# Patient Record
Sex: FEMALE | Race: WHITE | NOT HISPANIC OR LATINO | Employment: OTHER | URBAN - METROPOLITAN AREA
[De-identification: names, ages, dates, MRNs, and addresses within clinical notes are randomized per-mention and may not be internally consistent; named-entity substitution may affect disease eponyms.]

---

## 2023-02-07 ENCOUNTER — APPOINTMENT (OUTPATIENT)
Dept: GENERAL RADIOLOGY | Facility: CLINIC | Age: 73
End: 2023-02-07
Attending: EMERGENCY MEDICINE
Payer: COMMERCIAL

## 2023-02-07 ENCOUNTER — HOSPITAL ENCOUNTER (OUTPATIENT)
Facility: CLINIC | Age: 73
Setting detail: OBSERVATION
Discharge: HOME | End: 2023-02-09
Attending: EMERGENCY MEDICINE | Admitting: INTERNAL MEDICINE
Payer: COMMERCIAL

## 2023-02-07 ENCOUNTER — APPOINTMENT (OUTPATIENT)
Dept: ULTRASOUND IMAGING | Facility: CLINIC | Age: 73
End: 2023-02-07
Attending: EMERGENCY MEDICINE
Payer: COMMERCIAL

## 2023-02-07 DIAGNOSIS — R50.9 FEVER, UNSPECIFIED FEVER CAUSE: ICD-10-CM

## 2023-02-07 DIAGNOSIS — R74.8 ELEVATED LIVER ENZYMES: Primary | ICD-10-CM

## 2023-02-07 LAB
ALBUMIN SERPL BCG-MCNC: 3.9 G/DL (ref 3.5–5.2)
ALBUMIN UR-MCNC: 20 MG/DL
ALP SERPL-CCNC: 162 U/L (ref 35–104)
ALT SERPL W P-5'-P-CCNC: 299 U/L (ref 10–35)
ANION GAP SERPL CALCULATED.3IONS-SCNC: 10 MMOL/L (ref 7–15)
APPEARANCE UR: CLEAR
AST SERPL W P-5'-P-CCNC: 286 U/L (ref 10–35)
BACTERIA #/AREA URNS HPF: ABNORMAL /HPF
BASOPHILS # BLD AUTO: 0 10E3/UL (ref 0–0.2)
BASOPHILS NFR BLD AUTO: 0 %
BILIRUB SERPL-MCNC: 4.6 MG/DL
BILIRUB UR QL STRIP: NEGATIVE
BUN SERPL-MCNC: 10.8 MG/DL (ref 8–23)
CALCIUM SERPL-MCNC: 9 MG/DL (ref 8.8–10.2)
CHLORIDE SERPL-SCNC: 93 MMOL/L (ref 98–107)
COLOR UR AUTO: YELLOW
CREAT SERPL-MCNC: 0.84 MG/DL (ref 0.51–0.95)
DEPRECATED HCO3 PLAS-SCNC: 24 MMOL/L (ref 22–29)
EOSINOPHIL # BLD AUTO: 0 10E3/UL (ref 0–0.7)
EOSINOPHIL NFR BLD AUTO: 0 %
ERYTHROCYTE [DISTWIDTH] IN BLOOD BY AUTOMATED COUNT: 13.2 % (ref 10–15)
FLUAV RNA SPEC QL NAA+PROBE: NEGATIVE
FLUBV RNA RESP QL NAA+PROBE: NEGATIVE
GFR SERPL CREATININE-BSD FRML MDRD: 74 ML/MIN/1.73M2
GLUCOSE SERPL-MCNC: 159 MG/DL (ref 70–99)
GLUCOSE UR STRIP-MCNC: NEGATIVE MG/DL
HCT VFR BLD AUTO: 34.4 % (ref 35–47)
HGB BLD-MCNC: 11.8 G/DL (ref 11.7–15.7)
HGB UR QL STRIP: ABNORMAL
HOLD SPECIMEN: NORMAL
HOLD SPECIMEN: NORMAL
IMM GRANULOCYTES # BLD: 0.1 10E3/UL
IMM GRANULOCYTES NFR BLD: 0 %
KETONES UR STRIP-MCNC: ABNORMAL MG/DL
LACTATE SERPL-SCNC: 1 MMOL/L (ref 0.7–2)
LEUKOCYTE ESTERASE UR QL STRIP: NEGATIVE
LIPASE SERPL-CCNC: 25 U/L (ref 13–60)
LYMPHOCYTES # BLD AUTO: 0.9 10E3/UL (ref 0.8–5.3)
LYMPHOCYTES NFR BLD AUTO: 6 %
MCH RBC QN AUTO: 30.3 PG (ref 26.5–33)
MCHC RBC AUTO-ENTMCNC: 34.3 G/DL (ref 31.5–36.5)
MCV RBC AUTO: 88 FL (ref 78–100)
MONOCYTES # BLD AUTO: 1.2 10E3/UL (ref 0–1.3)
MONOCYTES NFR BLD AUTO: 8 %
NEUTROPHILS # BLD AUTO: 12.7 10E3/UL (ref 1.6–8.3)
NEUTROPHILS NFR BLD AUTO: 86 %
NITRATE UR QL: NEGATIVE
NRBC # BLD AUTO: 0 10E3/UL
NRBC BLD AUTO-RTO: 0 /100
NT-PROBNP SERPL-MCNC: 1771 PG/ML (ref 0–900)
PH UR STRIP: 6.5 [PH] (ref 5–7)
PLATELET # BLD AUTO: 210 10E3/UL (ref 150–450)
POTASSIUM SERPL-SCNC: 3.2 MMOL/L (ref 3.4–5.3)
PROT SERPL-MCNC: 7.2 G/DL (ref 6.4–8.3)
RBC # BLD AUTO: 3.9 10E6/UL (ref 3.8–5.2)
RBC URINE: 3 /HPF
RSV RNA SPEC NAA+PROBE: NEGATIVE
SARS-COV-2 RNA RESP QL NAA+PROBE: NEGATIVE
SODIUM SERPL-SCNC: 127 MMOL/L (ref 136–145)
SP GR UR STRIP: 1.01 (ref 1–1.03)
TROPONIN T SERPL HS-MCNC: 17 NG/L
UROBILINOGEN UR STRIP-MCNC: NORMAL MG/DL
WBC # BLD AUTO: 14.9 10E3/UL (ref 4–11)
WBC URINE: 2 /HPF

## 2023-02-07 PROCEDURE — 87040 BLOOD CULTURE FOR BACTERIA: CPT | Performed by: EMERGENCY MEDICINE

## 2023-02-07 PROCEDURE — 81001 URINALYSIS AUTO W/SCOPE: CPT | Performed by: EMERGENCY MEDICINE

## 2023-02-07 PROCEDURE — 85014 HEMATOCRIT: CPT | Performed by: EMERGENCY MEDICINE

## 2023-02-07 PROCEDURE — 250N000011 HC RX IP 250 OP 636: Performed by: EMERGENCY MEDICINE

## 2023-02-07 PROCEDURE — 120N000001 HC R&B MED SURG/OB

## 2023-02-07 PROCEDURE — 87637 SARSCOV2&INF A&B&RSV AMP PRB: CPT | Performed by: EMERGENCY MEDICINE

## 2023-02-07 PROCEDURE — 258N000003 HC RX IP 258 OP 636: Performed by: EMERGENCY MEDICINE

## 2023-02-07 PROCEDURE — 36415 COLL VENOUS BLD VENIPUNCTURE: CPT | Performed by: EMERGENCY MEDICINE

## 2023-02-07 PROCEDURE — 84484 ASSAY OF TROPONIN QUANT: CPT | Performed by: EMERGENCY MEDICINE

## 2023-02-07 PROCEDURE — 83605 ASSAY OF LACTIC ACID: CPT | Performed by: EMERGENCY MEDICINE

## 2023-02-07 PROCEDURE — 83690 ASSAY OF LIPASE: CPT | Performed by: EMERGENCY MEDICINE

## 2023-02-07 PROCEDURE — 96375 TX/PRO/DX INJ NEW DRUG ADDON: CPT

## 2023-02-07 PROCEDURE — 250N000013 HC RX MED GY IP 250 OP 250 PS 637: Performed by: EMERGENCY MEDICINE

## 2023-02-07 PROCEDURE — 76705 ECHO EXAM OF ABDOMEN: CPT

## 2023-02-07 PROCEDURE — 96361 HYDRATE IV INFUSION ADD-ON: CPT

## 2023-02-07 PROCEDURE — C9803 HOPD COVID-19 SPEC COLLECT: HCPCS

## 2023-02-07 PROCEDURE — 71046 X-RAY EXAM CHEST 2 VIEWS: CPT

## 2023-02-07 PROCEDURE — 83880 ASSAY OF NATRIURETIC PEPTIDE: CPT | Performed by: EMERGENCY MEDICINE

## 2023-02-07 PROCEDURE — 83735 ASSAY OF MAGNESIUM: CPT | Performed by: INTERNAL MEDICINE

## 2023-02-07 PROCEDURE — 96365 THER/PROPH/DIAG IV INF INIT: CPT

## 2023-02-07 PROCEDURE — 80053 COMPREHEN METABOLIC PANEL: CPT | Performed by: EMERGENCY MEDICINE

## 2023-02-07 PROCEDURE — 99222 1ST HOSP IP/OBS MODERATE 55: CPT | Mod: AI | Performed by: INTERNAL MEDICINE

## 2023-02-07 PROCEDURE — 99285 EMERGENCY DEPT VISIT HI MDM: CPT | Mod: 25,CS

## 2023-02-07 RX ORDER — POTASSIUM CHLORIDE 1.5 G/1.58G
20 POWDER, FOR SOLUTION ORAL ONCE
Status: COMPLETED | OUTPATIENT
Start: 2023-02-07 | End: 2023-02-08

## 2023-02-07 RX ORDER — LIDOCAINE 40 MG/G
CREAM TOPICAL
Status: DISCONTINUED | OUTPATIENT
Start: 2023-02-07 | End: 2023-02-09 | Stop reason: HOSPADM

## 2023-02-07 RX ORDER — ACETAMINOPHEN 650 MG/1
650 SUPPOSITORY RECTAL EVERY 6 HOURS PRN
Status: DISCONTINUED | OUTPATIENT
Start: 2023-02-07 | End: 2023-02-09 | Stop reason: HOSPADM

## 2023-02-07 RX ORDER — ACETAMINOPHEN 325 MG/1
650 TABLET ORAL EVERY 6 HOURS PRN
Status: DISCONTINUED | OUTPATIENT
Start: 2023-02-07 | End: 2023-02-09 | Stop reason: HOSPADM

## 2023-02-07 RX ORDER — ENOXAPARIN SODIUM 100 MG/ML
30 INJECTION SUBCUTANEOUS EVERY 24 HOURS
Status: DISCONTINUED | OUTPATIENT
Start: 2023-02-08 | End: 2023-02-09 | Stop reason: HOSPADM

## 2023-02-07 RX ORDER — PROCHLORPERAZINE MALEATE 5 MG
5 TABLET ORAL EVERY 6 HOURS PRN
Status: DISCONTINUED | OUTPATIENT
Start: 2023-02-07 | End: 2023-02-09 | Stop reason: HOSPADM

## 2023-02-07 RX ORDER — ONDANSETRON 4 MG/1
4 TABLET, ORALLY DISINTEGRATING ORAL EVERY 6 HOURS PRN
Status: DISCONTINUED | OUTPATIENT
Start: 2023-02-07 | End: 2023-02-09 | Stop reason: HOSPADM

## 2023-02-07 RX ORDER — ONDANSETRON 2 MG/ML
4 INJECTION INTRAMUSCULAR; INTRAVENOUS EVERY 6 HOURS PRN
Status: DISCONTINUED | OUTPATIENT
Start: 2023-02-07 | End: 2023-02-09 | Stop reason: HOSPADM

## 2023-02-07 RX ORDER — ONDANSETRON 2 MG/ML
4 INJECTION INTRAMUSCULAR; INTRAVENOUS ONCE
Status: COMPLETED | OUTPATIENT
Start: 2023-02-07 | End: 2023-02-07

## 2023-02-07 RX ORDER — PROCHLORPERAZINE 25 MG
12.5 SUPPOSITORY, RECTAL RECTAL EVERY 12 HOURS PRN
Status: DISCONTINUED | OUTPATIENT
Start: 2023-02-07 | End: 2023-02-09 | Stop reason: HOSPADM

## 2023-02-07 RX ORDER — FAMOTIDINE 20 MG/1
20 TABLET, FILM COATED ORAL 2 TIMES DAILY
Status: DISCONTINUED | OUTPATIENT
Start: 2023-02-07 | End: 2023-02-09 | Stop reason: HOSPADM

## 2023-02-07 RX ORDER — SODIUM CHLORIDE 9 MG/ML
INJECTION, SOLUTION INTRAVENOUS CONTINUOUS
Status: DISCONTINUED | OUTPATIENT
Start: 2023-02-07 | End: 2023-02-09 | Stop reason: HOSPADM

## 2023-02-07 RX ORDER — ACETAMINOPHEN 500 MG
1000 TABLET ORAL ONCE
Status: COMPLETED | OUTPATIENT
Start: 2023-02-07 | End: 2023-02-07

## 2023-02-07 RX ADMIN — ACETAMINOPHEN 1000 MG: 500 TABLET ORAL at 19:29

## 2023-02-07 RX ADMIN — ONDANSETRON 4 MG: 2 INJECTION INTRAMUSCULAR; INTRAVENOUS at 19:29

## 2023-02-07 RX ADMIN — SODIUM CHLORIDE 1000 ML: 9 INJECTION, SOLUTION INTRAVENOUS at 19:00

## 2023-02-07 RX ADMIN — TAZOBACTAM SODIUM AND PIPERACILLIN SODIUM 3.38 G: 375; 3 INJECTION, SOLUTION INTRAVENOUS at 21:17

## 2023-02-07 ASSESSMENT — ENCOUNTER SYMPTOMS
CHILLS: 1
SHORTNESS OF BREATH: 0
ABDOMINAL PAIN: 1
NAUSEA: 1
FEVER: 1
DIARRHEA: 0
VOMITING: 1
CONFUSION: 0
COUGH: 0
DYSURIA: 0
HEADACHES: 0

## 2023-02-07 ASSESSMENT — ACTIVITIES OF DAILY LIVING (ADL)
ADLS_ACUITY_SCORE: 35

## 2023-02-08 ENCOUNTER — APPOINTMENT (OUTPATIENT)
Dept: CT IMAGING | Facility: CLINIC | Age: 73
End: 2023-02-08
Attending: INTERNAL MEDICINE
Payer: COMMERCIAL

## 2023-02-08 LAB
ALBUMIN SERPL BCG-MCNC: 2.9 G/DL (ref 3.5–5.2)
ALP SERPL-CCNC: 122 U/L (ref 35–104)
ALT SERPL W P-5'-P-CCNC: 191 U/L (ref 10–35)
ANION GAP SERPL CALCULATED.3IONS-SCNC: 8 MMOL/L (ref 7–15)
AST SERPL W P-5'-P-CCNC: 147 U/L (ref 10–35)
BASOPHILS # BLD AUTO: 0 10E3/UL (ref 0–0.2)
BASOPHILS NFR BLD AUTO: 0 %
BILIRUB SERPL-MCNC: 2.9 MG/DL
BUN SERPL-MCNC: 8.9 MG/DL (ref 8–23)
CALCIUM SERPL-MCNC: 8.4 MG/DL (ref 8.8–10.2)
CHLORIDE SERPL-SCNC: 100 MMOL/L (ref 98–107)
CREAT SERPL-MCNC: 0.84 MG/DL (ref 0.51–0.95)
DEPRECATED HCO3 PLAS-SCNC: 23 MMOL/L (ref 22–29)
EOSINOPHIL # BLD AUTO: 0 10E3/UL (ref 0–0.7)
EOSINOPHIL NFR BLD AUTO: 0 %
ERYTHROCYTE [DISTWIDTH] IN BLOOD BY AUTOMATED COUNT: 13.4 % (ref 10–15)
GFR SERPL CREATININE-BSD FRML MDRD: 74 ML/MIN/1.73M2
GLUCOSE SERPL-MCNC: 118 MG/DL (ref 70–99)
HCT VFR BLD AUTO: 31.8 % (ref 35–47)
HGB BLD-MCNC: 10.8 G/DL (ref 11.7–15.7)
IMM GRANULOCYTES # BLD: 0.1 10E3/UL
IMM GRANULOCYTES NFR BLD: 1 %
LYMPHOCYTES # BLD AUTO: 1 10E3/UL (ref 0.8–5.3)
LYMPHOCYTES NFR BLD AUTO: 9 %
MAGNESIUM SERPL-MCNC: 1.6 MG/DL (ref 1.7–2.3)
MAGNESIUM SERPL-MCNC: 1.8 MG/DL (ref 1.7–2.3)
MCH RBC QN AUTO: 30.3 PG (ref 26.5–33)
MCHC RBC AUTO-ENTMCNC: 34 G/DL (ref 31.5–36.5)
MCV RBC AUTO: 89 FL (ref 78–100)
MONOCYTES # BLD AUTO: 0.7 10E3/UL (ref 0–1.3)
MONOCYTES NFR BLD AUTO: 6 %
NEUTROPHILS # BLD AUTO: 9.3 10E3/UL (ref 1.6–8.3)
NEUTROPHILS NFR BLD AUTO: 84 %
NRBC # BLD AUTO: 0 10E3/UL
NRBC BLD AUTO-RTO: 0 /100
PLATELET # BLD AUTO: 182 10E3/UL (ref 150–450)
POTASSIUM SERPL-SCNC: 3.4 MMOL/L (ref 3.4–5.3)
POTASSIUM SERPL-SCNC: 4.4 MMOL/L (ref 3.4–5.3)
PROT SERPL-MCNC: 6.2 G/DL (ref 6.4–8.3)
RBC # BLD AUTO: 3.57 10E6/UL (ref 3.8–5.2)
SODIUM SERPL-SCNC: 131 MMOL/L (ref 136–145)
WBC # BLD AUTO: 11.1 10E3/UL (ref 4–11)

## 2023-02-08 PROCEDURE — 96376 TX/PRO/DX INJ SAME DRUG ADON: CPT

## 2023-02-08 PROCEDURE — 86709 HEPATITIS A IGM ANTIBODY: CPT | Performed by: INTERNAL MEDICINE

## 2023-02-08 PROCEDURE — G0378 HOSPITAL OBSERVATION PER HR: HCPCS

## 2023-02-08 PROCEDURE — 36415 COLL VENOUS BLD VENIPUNCTURE: CPT | Performed by: INTERNAL MEDICINE

## 2023-02-08 PROCEDURE — 250N000011 HC RX IP 250 OP 636: Performed by: INTERNAL MEDICINE

## 2023-02-08 PROCEDURE — 250N000013 HC RX MED GY IP 250 OP 250 PS 637: Performed by: INTERNAL MEDICINE

## 2023-02-08 PROCEDURE — 96366 THER/PROPH/DIAG IV INF ADDON: CPT

## 2023-02-08 PROCEDURE — 99232 SBSQ HOSP IP/OBS MODERATE 35: CPT | Performed by: INTERNAL MEDICINE

## 2023-02-08 PROCEDURE — 258N000003 HC RX IP 258 OP 636: Performed by: INTERNAL MEDICINE

## 2023-02-08 PROCEDURE — 86790 VIRUS ANTIBODY NOS: CPT | Performed by: INTERNAL MEDICINE

## 2023-02-08 PROCEDURE — 87340 HEPATITIS B SURFACE AG IA: CPT | Performed by: INTERNAL MEDICINE

## 2023-02-08 PROCEDURE — 86706 HEP B SURFACE ANTIBODY: CPT | Performed by: INTERNAL MEDICINE

## 2023-02-08 PROCEDURE — 96372 THER/PROPH/DIAG INJ SC/IM: CPT | Performed by: INTERNAL MEDICINE

## 2023-02-08 PROCEDURE — 85025 COMPLETE CBC W/AUTO DIFF WBC: CPT | Performed by: INTERNAL MEDICINE

## 2023-02-08 PROCEDURE — 84132 ASSAY OF SERUM POTASSIUM: CPT | Performed by: INTERNAL MEDICINE

## 2023-02-08 PROCEDURE — 74177 CT ABD & PELVIS W/CONTRAST: CPT

## 2023-02-08 PROCEDURE — 250N000011 HC RX IP 250 OP 636: Performed by: EMERGENCY MEDICINE

## 2023-02-08 PROCEDURE — 86803 HEPATITIS C AB TEST: CPT | Performed by: INTERNAL MEDICINE

## 2023-02-08 PROCEDURE — 80053 COMPREHEN METABOLIC PANEL: CPT | Performed by: INTERNAL MEDICINE

## 2023-02-08 PROCEDURE — 83735 ASSAY OF MAGNESIUM: CPT | Performed by: INTERNAL MEDICINE

## 2023-02-08 RX ORDER — CHLORTHALIDONE 25 MG/1
6.25 TABLET ORAL DAILY
COMMUNITY

## 2023-02-08 RX ORDER — OLMESARTAN MEDOXOMIL 20 MG/1
20 TABLET ORAL DAILY
COMMUNITY

## 2023-02-08 RX ORDER — IOPAMIDOL 755 MG/ML
500 INJECTION, SOLUTION INTRAVASCULAR ONCE
Status: COMPLETED | OUTPATIENT
Start: 2023-02-08 | End: 2023-02-08

## 2023-02-08 RX ORDER — ACETAMINOPHEN 325 MG/1
325-650 TABLET ORAL EVERY 6 HOURS PRN
COMMUNITY

## 2023-02-08 RX ORDER — POTASSIUM CHLORIDE 1.5 G/1.58G
40 POWDER, FOR SOLUTION ORAL ONCE
Status: COMPLETED | OUTPATIENT
Start: 2023-02-08 | End: 2023-02-08

## 2023-02-08 RX ORDER — PANTOPRAZOLE SODIUM 40 MG/1
40 TABLET, DELAYED RELEASE ORAL DAILY
COMMUNITY

## 2023-02-08 RX ADMIN — FAMOTIDINE 20 MG: 20 TABLET, FILM COATED ORAL at 20:33

## 2023-02-08 RX ADMIN — IOPAMIDOL 73 ML: 755 INJECTION, SOLUTION INTRAVENOUS at 00:27

## 2023-02-08 RX ADMIN — POTASSIUM CHLORIDE 40 MEQ: 1.5 POWDER, FOR SOLUTION ORAL at 10:53

## 2023-02-08 RX ADMIN — SODIUM CHLORIDE: 9 INJECTION, SOLUTION INTRAVENOUS at 00:06

## 2023-02-08 RX ADMIN — POTASSIUM CHLORIDE 20 MEQ: 1.5 POWDER, FOR SOLUTION ORAL at 00:07

## 2023-02-08 RX ADMIN — TAZOBACTAM SODIUM AND PIPERACILLIN SODIUM 3.38 G: 375; 3 INJECTION, SOLUTION INTRAVENOUS at 13:02

## 2023-02-08 RX ADMIN — TAZOBACTAM SODIUM AND PIPERACILLIN SODIUM 3.38 G: 375; 3 INJECTION, SOLUTION INTRAVENOUS at 20:46

## 2023-02-08 RX ADMIN — ACETAMINOPHEN 650 MG: 325 TABLET ORAL at 02:50

## 2023-02-08 RX ADMIN — TAZOBACTAM SODIUM AND PIPERACILLIN SODIUM 3.38 G: 375; 3 INJECTION, SOLUTION INTRAVENOUS at 05:27

## 2023-02-08 RX ADMIN — SODIUM CHLORIDE: 9 INJECTION, SOLUTION INTRAVENOUS at 20:33

## 2023-02-08 RX ADMIN — FAMOTIDINE 20 MG: 20 TABLET, FILM COATED ORAL at 07:37

## 2023-02-08 RX ADMIN — FAMOTIDINE 20 MG: 20 TABLET, FILM COATED ORAL at 00:07

## 2023-02-08 RX ADMIN — SODIUM CHLORIDE: 9 INJECTION, SOLUTION INTRAVENOUS at 09:28

## 2023-02-08 RX ADMIN — ENOXAPARIN SODIUM 30 MG: 30 INJECTION SUBCUTANEOUS at 07:37

## 2023-02-08 ASSESSMENT — ACTIVITIES OF DAILY LIVING (ADL)
ADLS_ACUITY_SCORE: 37
ADLS_ACUITY_SCORE: 35
ADLS_ACUITY_SCORE: 37
ADLS_ACUITY_SCORE: 35

## 2023-02-08 NOTE — ED NOTES
[facility]  ED Nurse Handoff Report    Ana Rao is a 72 year old female   ED Chief complaint: Vomiting  . ED Diagnosis:   Final diagnoses:   None     Allergies: No Known Allergies    Code Status: Full Code  Activity level - Baseline/Home:  Independent. Activity Level - Current:   Stand by Assist. Lift room needed: No. Bariatric: No   Needed: No   Isolation: No. Infection: Not Applicable.     Vital Signs:   Vitals:    02/07/23 1930 02/07/23 2045 02/07/23 2225 02/07/23 2230   BP: 138/68 111/88 99/52    Pulse: 78 77  64   Resp:       Temp:  99.4  F (37.4  C)     TempSrc:  Oral     SpO2: 100% 100%  100%   Weight:           Cardiac Rhythm:  ,      Pain level:    Patient confused: No. Patient Falls Risk: Yes.   Elimination Status: Has voided   Patient Report - Initial Complaint: Ana Rao is a 72 year old female who presents for evaluation of vomiting, fever, chills, leg stiffness.  She presents with family including son-in-law who provides a portion of the history.  She recently traveled from [facility] 3 days ago where she lives.  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  She developed chills, fever, 2 episodes of emesis, leg stiffness earlier today.  She had an episode of gastritis yesterday where she felt pain in the bilateral upper quadrants of her abdomen.  That is gone today.  She had no significant appetite today and not eating and drinking due to the vomiting.  No known sick contacts.  No chest pain, cough, or shortness of breath.  No rhinorrhea, congestion, or sore throat.  No history of abdominal problems or surgeries. Focused Assessment: Ana Rao is a 72 year old female who presents for evaluation of vomiting, fever, chills, leg stiffness.  She presents with family including son-in-law who provides a portion of the history.  She recently traveled from  [place] 3 days ago where she lives.  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  She developed chills, fever, 2 episodes of emesis, leg stiffness earlier today.  She had an episode of gastritis yesterday where she felt pain in the bilateral upper quadrants of her abdomen.  That is gone today.  She had no significant appetite today and not eating and drinking due to the vomiting.  No known sick contacts.  No chest pain, cough, or shortness of breath.  No rhinorrhea, congestion, or sore throat.  No history of abdominal problems or surgeries   Tests Performed:   Abdomen US, limited (RUQ only)   Final Result   IMPRESSION:   1.  Biliary sludge. No cholelithiasis or evidence of acute cholecystitis.      2.  Prominent right renal pelvis, possibly representing extrarenal pelvis, although mild hydronephrosis not excluded.            XR Chest 2 Views   Final Result   IMPRESSION: Trace interstitial edema. Tiny right pleural effusion. No focal airspace consolidation. No pneumothorax.      Upper limits of normal heart size.       . Abnormal Results:   Labs Ordered and Resulted from Time of ED Arrival to Time of ED Departure   COMPREHENSIVE METABOLIC PANEL - Abnormal       Result Value    Sodium 127 (*)     Potassium 3.2 (*)     Chloride 93 (*)     Carbon Dioxide (CO2) 24      Anion Gap 10      Urea Nitrogen 10.8      Creatinine 0.84      Calcium 9.0      Glucose 159 (*)     Alkaline Phosphatase 162 (*)      (*)      (*)     Protein Total 7.2      Albumin 3.9      Bilirubin Total 4.6 (*)     GFR Estimate 74     ROUTINE UA WITH MICROSCOPIC REFLEX TO CULTURE - Abnormal    Color Urine Yellow      Appearance Urine Clear      Glucose Urine Negative      Bilirubin Urine Negative      Ketones Urine Trace (*)     Specific Gravity Urine 1.010      Blood Urine Small (*)     pH Urine 6.5      Protein Albumin Urine 20 (*)      Urobilinogen Urine Normal      Nitrite Urine Negative      Leukocyte Esterase Urine Negative      Bacteria Urine Few (*)     RBC Urine 3 (*)     WBC Urine 2     CBC WITH PLATELETS AND DIFFERENTIAL - Abnormal    WBC Count 14.9 (*)     RBC Count 3.90      Hemoglobin 11.8      Hematocrit 34.4 (*)     MCV 88      MCH 30.3      MCHC 34.3      RDW 13.2      Platelet Count 210      % Neutrophils 86      % Lymphocytes 6      % Monocytes 8      % Eosinophils 0      % Basophils 0      % Immature Granulocytes 0      NRBCs per 100 WBC 0      Absolute Neutrophils 12.7 (*)     Absolute Lymphocytes 0.9      Absolute Monocytes 1.2      Absolute Eosinophils 0.0      Absolute Basophils 0.0      Absolute Immature Granulocytes 0.1      Absolute NRBCs 0.0     TROPONIN T, HIGH SENSITIVITY - Abnormal    Troponin T, High Sensitivity 17 (*)    NT PROBNP INPATIENT - Abnormal    N terminal Pro BNP Inpatient 1,771 (*)    LIPASE - Normal    Lipase 25     LACTIC ACID WHOLE BLOOD - Normal    Lactic Acid 1.0     INFLUENZA A/B & SARS-COV2 PCR MULTIPLEX - Normal    Influenza A PCR Negative      Influenza B PCR Negative      RSV PCR Negative      SARS CoV2 PCR Negative     BLOOD CULTURE   BLOOD CULTURE    .   Treatments provided: See MAR  Family Comments: Sister at bedside. Family very involved.   OBS brochure/video discussed/provided to patient:  Yes  ED Medications:   Medications   0.9% sodium chloride BOLUS (0 mLs Intravenous Stopped 2/7/23 2047)   ondansetron (ZOFRAN) injection 4 mg (4 mg Intravenous Given 2/7/23 1929)   acetaminophen (TYLENOL) tablet 1,000 mg (1,000 mg Oral Given 2/7/23 1929)   piperacillin-tazobactam (ZOSYN) infusion 3.375 g (0 g Intravenous Stopped 2/7/23 2215)     Drips infusing:  No  For the majority of the shift, the patient's behavior Green. Interventions performed were NA.    Sepsis treatment initiated: No     Patient tested for COVID 19 prior to admission: YES    ED Nurse Name/Phone Number: Marily Ochoa RN,   10:48  PM     RECEIVING UNIT ED HANDOFF REVIEW    Above ED Nurse Handoff Report was reviewed: Yes  Reviewed by: Lisa Tracy RN on February 8, 2023 at 6:40 PM

## 2023-02-08 NOTE — ED TRIAGE NOTES
Pt here w/ family for nausea, vomiting, and chills. Traveled here from [place] 3 days ago to visit family. Two episodes of emesis today. Did have some protonix yesterday. Home covid test neg. Pt is a&o x 4. ABCs intact at this time.

## 2023-02-08 NOTE — PROGRESS NOTES
1640: Provider page: Pt states feeling better. Wants to discharge. Current Vitals in, K+ lab rechecked. Please advise.    1714: Provider concerned about fevers. Wants patient to stay  and discharge tomorrow.    1716: Provider repaged: Patient still wants to discharge. Family at the bedside now. Requesting to talk to provider.    1717: Provider meeting with patient and family.     1728: Provider update: Patient will stay tonight.

## 2023-02-08 NOTE — H&P
[facility]    History and Physical - Hospitalist Service       Date of Admission:  2/7/2023    Assessment & Plan      Ana Rao is a 72 year old female admitted on 2/7/2023. She is originally traveling from [facility] and is a visitor in the [facility]    Abdominal pain and chills:  In the setting of elevated WBC count across-the-board elevated liver enzymes definitely raises a concern for cholangitis.  At this point in time I think it is prudent and appropriate for us to obtain a CT of the abdomen pelvis which has been ordered.  Patient's family would really like her to be discharged at the soonest possible given the logistical issues that they are facing.  However at this point in time I do think it is inappropriate for the patient to be discharged given her significant illness and the potential for rapid decompensation and eventually worsening morbidity  We will treat her with IV fluids, Zosyn keep on a clear liquid diet, she has been cultured in the emergency room.  Antibiotic adjustments can be made based upon the same.  At this point in time until we have imaging results and other evidence I am hesitant to involve any other consultant.  2/.  Logistical challenge: As mentioned patient is a visitor from [place] and is intending to return to [place] on the 10th.  Additionally patient's son-in-law makes it abundantly clear that they do have catastrophic travel insurance but does not understand the extent of coverage of the same.  However they would like to do what is really necessary to keep her safe.  3/.  VTE prophylaxis will be done using enoxaparin  /.  Supportive care PPI, IV fluids, nausea and pain medications will be provided to the patient.  Further interventions and course will depend upon the outcome of the CT scan and the progress the patient makes    Addendum: Patient did have proBNP undertaken by the ER provider which is elevated she does not appear to be  floridly in failure, she is not hypoxic she does not have distended neck veins or peripheral edema hence I am reluctant to pursue other work-up for the same unless it becomes necessary based upon the reasons mentioned above     Diet: Combination Diet Clear Liquid    DVT Prophylaxis: Enoxaparin (Lovenox) SQ  Carroll Catheter: Not present  Lines: None     Cardiac Monitoring: None  Code Status: Full Code      Clinically Significant Risk Factors Present on Admission        # Hypokalemia: Lowest K = 3.2 mmol/L in last 2 days, will replace as needed  # Hyponatremia: Lowest Na = 127 mmol/L in last 2 days, will monitor as appropriate                       Disposition Plan      Expected Discharge Date: 02/09/2023                  Samreen Vallejo MD  Hospitalist Service  [facility]  Securely message with [employer] (more info)  Text page via [employer] Paging/Directory     ______________________________________________________________________    Chief Complaint   Chills and abdominal pain    History is obtained from the patient  Son in law and ER provder    History of Present Illness   Ana Rao is a 72 year old female who has no major past medical history and is traveling from [facility] to [place] for pleasure purposes is brought to the emergency department by her son-in-law who is also visiting from [facility].  According to the patient to live in the [place] part of [facility] and arrived in the [place] on 30 January to visit some family in [place].  They arrived to [place] about 48 hours ago and in the last 24 hours she has been having a lot of chills generalized fatigue and abdominal discomfort.  She has been vomiting and has been unable to keep anything down.  Because of these issues and noted to have a temperature at 101 Fahrenheit at home her family brought her to the emergency room  Work-up in the ER revealed her to have slightly soft blood pressure of systolic in the 100 range, she was noted  to have significantly elevated WBC count at 14.1 with a left shift and across the board elevated liver enzymes including elevated bilirubin.  Potassium was slightly low and sodium was also slightly low.  Ultrasound of the abdomen showed gallbladder sludge, patient was administered Zosyn and requested for admission.  Patient is being evaluated by myself in the emergency room.  She is English speaking and as mentioned she is accompanied by her son-in-law who is also visiting from [place]  She tells me her biggest issue is the chills that she is experiencing.        Past Medical History    Osteoarthritis    Past Surgical History   No past surgical history on file.    Prior to Admission Medications   None        Physical Exam   Vital Signs: Temp: 99.4  F (37.4  C) Temp src: Oral BP: 99/52 Pulse: 64   Resp: 18 SpO2: 100 %      Weight: 145 lbs 8.06 oz    General Appearance: Alert awake oriented x3 petite built  Respiratory: Clear to auscultation with diminished basal breath sounds secondary to poor inspiratory effort  Cardiovascular: S1-S2 regular rate and voluntary guarding, no rebound or rigidity  GI: There is nonspecific tenderness in the upper quadrant as well as the mid abdomen in the epigastric area  Skin: No rash or lesions  Other: No obvious neurological deficits    Medical Decision Making       70 MINUTES SPENT BY ME on the date of service doing chart review, history, exam, documentation & further activities per the note.      enoxaparin ANTICOAGULANT  30 mg Subcutaneous Q24H     famotidine  20 mg Oral BID     piperacillin-tazobactam  3.375 g Intravenous Q8H     potassium chloride  20 mEq Oral Once     sodium chloride (PF)  3 mL Intracatheter Q8H       Data     I have personally reviewed the following data over the past 24 hrs:    14.9 (H)  \   11.8   / 210     127 (L) 93 (L) 10.8 /  159 (H)   3.2 (L) 24 0.84 \       ALT: 299 (H) AST: 286 (H) AP: 162 (H) TBILI: 4.6 (H)   ALB: 3.9 TOT PROTEIN: 7.2 LIPASE: 25        Trop: 17 (H) BNP: 1,771 (H)       Procal: N/A CRP: N/A Lactic Acid: 1.0         Imaging results reviewed over the past 24 hrs:   Recent Results (from the past 24 hour(s))   XR Chest 2 Views    Narrative    EXAM: XR CHEST 2 VIEWS  LOCATION: [facility]  DATE/TIME: 2/7/2023 8:42 PM    INDICATION: Fever and chills.  COMPARISON: None available.      Impression    IMPRESSION: Trace interstitial edema. Tiny right pleural effusion. No focal airspace consolidation. No pneumothorax.    Upper limits of normal heart size.   Abdomen US, limited (RUQ only)    Narrative    EXAM: US ABDOMEN LIMITED  LOCATION: [facility]  DATE/TIME: 2/7/2023 8:46 PM    INDICATION: Fever and vomiting. Elevated liver enzymes.  COMPARISON: None.  TECHNIQUE: Limited abdominal ultrasound.    FINDINGS:    GALLBLADDER: Layering nonshadowing hyperechoic material in the gallbladder, likely biliary sludge. No shadowing gallstones identified. Gallbladder is partially decompressed. No appreciable gallbladder wall thickening. No pericholecystic fluid. Negative   sonographic Nicholson's sign.    BILE DUCTS: No biliary dilatation. The common duct measures 3 mm.    LIVER: Normal parenchyma with smooth contour. No focal mass.    RIGHT KIDNEY: Prominent right renal pelvis. No sonographically detectable renal calculi.    PANCREAS: The visualized portions are normal.    No ascites.      Impression    IMPRESSION:  1.  Biliary sludge. No cholelithiasis or evidence of acute cholecystitis.    2.  Prominent right renal pelvis, possibly representing extrarenal pelvis, although mild hydronephrosis not excluded.

## 2023-02-08 NOTE — UTILIZATION REVIEW
"Admission Status; Secondary Review Determination     Admission Date: 2/7/2023  6:32 PM       Under the authority of the Utilization Management Committee, the utilization review process indicated a secondary review on the above patient.  The review outcome is based on review of the medical records, discussions with staff, and applying clinical experience noted on the date of the review.          (x) Observation Status Appropriate - This patient does not meet hospital inpatient criteria and is placed in observation status. If this patient's primary payer is Medicare and was admitted as an inpatient, Condition Code 44 should be used and patient status changed to \"observation\".       RATIONALE FOR DETERMINATION      Brief clinical presentation, information copied from the chart, abbreviated and edited for relevant content:     Discussed with attending. WIll change to OBS, improving, likely discharge tomorrow.     Ana Rao is a 72 year old female  who presented on 2/7/2023 for evaluation of abdominal pain and chills. She developed fever to 101. On admission, fever to 100.2 up to 103.3 overnight after admission. Laboratory evaluation showed WBC 14.9, Na 12, K 3.2,total bilirubin 4.6, alk [id number], , [id number],BNP  1771, and troponin 17. US of abdomen showed biliary sludge but no cholelithiasis or acute cholecystitis. She was started on Zosyn and admitted to the hospital for further cares.This morning after admission LFTs had improved: total biliubin 2.9, alk phos 122, , and . She was feeling better without pain.         The severity of illness, intensity of cares provided, risk for adverse outcome, and expected LOS make the care appropriate for observation.       The information on this document is developed by the utilization review team in order for the business office to ensure compliance.  This only denotes the appropriateness of proper admission status and does not reflect the quality of " care rendered.         The definitions of Inpatient Status and Observation Status used in making the determination above are those provided in the CMS Coverage Manual, Chapter 1 and Chapter 6, section 70.4.      Sincerely,     Felicia Miller MD   Utilization Review/ Case Management  [facility].

## 2023-02-08 NOTE — PLAN OF CARE
"PRIMARY DIAGNOSIS: ELEVATED LIVER ENZYMES  OUTPATIENT/OBSERVATION GOALS TO BE MET BEFORE DISCHARGE:  ADLs back to baseline: Yes    Activity and level of assistance: Up with standby assistance.    Pain status: Pain free.    Return to near baseline physical activity: Yes     Discharge Planner Nurse   Safe discharge environment identified: Yes  Barriers to discharge: Yes       Entered by: Jazzmine Rodrigues RN 02/08/2023 11:16 AM   Pt.A&O, SBA for mobility, denied pain, on regular diet, voiding adequately, on IVSC-100 ml/hr, potassium was replaced today, recheck at 15:00pm.  /64 (BP Location: Left arm)   Pulse 64   Temp 99.1  F (37.3  C) (Oral)   Resp 18   Ht 1.575 m (5' 2\")   Wt 66.7 kg (147 lb)   SpO2 98%   BMI 26.89 kg/m     Please review provider order for any additional goals.   Nurse to notify provider when observation goals have been met and patient is ready for discharge.      "

## 2023-02-08 NOTE — PROGRESS NOTES
"PRIMARY DIAGNOSIS: ELEVATED LIVER ENZYMES.  OUTPATIENT/OBSERVATION GOALS TO BE MET BEFORE DISCHARGE:  1. ADLs back to baseline: Yes    2. Activity and level of assistance: Up with standby assistance.    3. Pain status: Pain free.    4. Return to near baseline physical activity: Yes     Discharge Planner Nurse   Safe discharge environment identified: Yes  Barriers to discharge: Yes       Entered by: Jazzmine Rodrigues RN 02/08/2023 09:00AM    Pt.A&O, SBA for mobility, denied pain, voiding adequately, on IVSC-100 ml/hr, Pt. Is on clear liquid diet, CMS intact.  /64 (BP Location: Left arm)   Pulse 64   Temp 99.1  F (37.3  C) (Oral)   Resp 18   Ht 1.575 m (5' 2\")   Wt 66.7 kg (147 lb)   SpO2 98%   BMI 26.89 kg/m    Please review provider order for any additional goals.   Nurse to notify provider when observation goals have been met and patient is ready for discharge.  "

## 2023-02-08 NOTE — CONSULTS
GASTROENTEROLOGY CONSULTATION     Ana Rao  [address] [facility] [id number]  72 year old female    Admission Date/Time: 2/7/2023  Primary Care Provider: No Ref-Primary, Physician    We were asked to see the patient in consultation by Dr. Chapman for evaluation of abnormal LFTs.        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  She [place] the [place] on January 30, 2023.  She presented to the hospital with chills, generalized fatigue, vomiting, and fever.  She reports she feels much better today, states all of her symptoms have resolved, and is wondering if she can discharge home today.    She had a CT scan and an abdominal ultrasound.    The abdominal US done 2-7-23:    1.  Biliary sludge. No cholelithiasis or evidence of acute cholecystitis.     2.  Prominent right renal pelvis, possibly representing extrarenal pelvis, although mild hydronephrosis not excluded.       The CT scan done 2-8-23:    LOWER CHEST: Normal.     HEPATOBILIARY: No significant mass or bile duct dilatation. No calcified gallstones.      PANCREAS: Normal.     SPLEEN: Normal.     ADRENAL GLANDS: Normal.     KIDNEYS/BLADDER: No significant mass, stone, or hydronephrosis.     BOWEL: Normal.     LYMPH NODES: Normal.     VASCULATURE: Unremarkable.     PELVIC ORGANS: Normal.      Her white blood cell count on admission was elevated with a left shift.  This has improved today, but continues to have a left shift.  Her liver function tests were elevated on admission with an alkaline phosphatase of 162, improved today to 122.  ALT elevated at 299 improved today to 191.  AST elevated at 286 improved today to 147.  Total bilirubin elevated at 4.6 improved today to 2.9.  COVID, influenza a and B, and RSV are negative..      ROS: A comprehensive ten point review of systems  was negative aside from those in mentioned in the HPI.      MEDICATIONS: No current facility-administered medications on file prior to encounter.  acetaminophen (TYLENOL) 325 MG tablet, Take 325-650 mg by mouth every 6 hours as needed for mild pain  chlorthalidone (HYGROTON) 25 MG tablet, Take 6.25 mg by mouth daily  olmesartan (BENICAR) 20 MG tablet, Take 20 mg by mouth daily  pantoprazole (PROTONIX) 40 MG EC tablet, Take 40 mg by mouth daily        ALLERGIES: No Known Allergies    No past medical history on file.    No past surgical history on file.      SOCIAL HISTORY:       FAMILY HISTORY:  Reviewed in her chart    PHYSICAL EXAM:   /56   Pulse 72   Temp 98.8  F (37.1  C)   Resp 18   Wt 66 kg (145 lb 8.1 oz)   SpO2 99%     Constitutional: NAD, comfortable  Cardiovascular: RRR  Respiratory: CTAB  Psychiatric: mentation appears normal and affect normal/bright  Head: Normocephalic. Atraumatic.    Neck: Neck supple.   Eyes:  No icterus  ENT: hearing adequate  Abdomen:   Soft/nt/nd, nabs  NEURO: grossly negative  SKIN: no suspicious lesions or rashes          ADDITIONAL COMMENTS:   I reviewed the patient's new clinical lab test results.   Recent Labs   Lab Test 02/08/23  0825 02/07/23  1859   WBC 11.1* 14.9*   HGB 10.8* 11.8   MCV 89 88    210     Recent Labs   Lab Test 02/08/23  0825 02/07/23  1859   * 127*   POTASSIUM 3.4 3.2*   CHLORIDE 100 93*   CO2 23 24   BUN 8.9 10.8   CR 0.84 0.84   ANIONGAP 8 10   DESI 8.4* 9.0   * 159*     Recent Labs   Lab Test 02/08/23  0825 02/07/23  2047 02/07/23  1859   ALBUMIN 2.9*  --  3.9   BILITOTAL 2.9*  --  4.6*   *  --  299*   *  --  286*   ALKPHOS 122*  --  162*   PROTEIN  --  20*  --    LIPASE  --   --  25             .    CONSULTATION ASSESSMENT AND PLAN:    Patient is from [place], visiting family, arrived 1-30-23 to the [facility].  Presents with Elevated LFTs and systemic symptoms of fever, chills, generalized fatigue, and vomiting.  The  differential diagnosis for this includes an infectious etiology and less likely congestive hepatopathy (noted elevated BNP, and hospitalists notes that clinically she does not appear to be in heart failure).  I doubt that she has hepatitis A, B, C, E (hep A and E common in angeline) however we will check serologies for this.  She could have passed sludge given the pattern of the liver function test with improvement today (GB sludge noted on US).  She could have had a transient bacteremia/sepsis (unclear source) that caused LFTs to elevate.  She has improved with broad-spectrum antibiotics with Zosyn.  At 2:48 AM she had a temperature of 103.3, and currently is afebrile.  She reports all of her symptoms have now resolved and is wondering if she can discharge home.    -- check hep a/b/c/e  -- continue supportive measures  -- check LFTs daily if patient does not discharge home today.  -- can have general diet  -- antibiotics per hospistalist  -- We will continue to follow.    Kali Maza MD  [facility]

## 2023-02-08 NOTE — ED PROVIDER NOTES
History     Chief Complaint:  Vomiting       HPI   Ana Rao is a 72 year old female who presents for evaluation of vomiting, fever, chills, leg stiffness.  She presents with family including son-in-law who provides a portion of the history.  She recently traveled from [place] 3 days ago where she lives.  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  She developed chills, fever, 2 episodes of emesis, leg stiffness earlier today.  She had an episode of gastritis yesterday where she felt pain in the bilateral upper quadrants of her abdomen.  That is gone today.  She had no significant appetite today and not eating and drinking due to the vomiting.  No known sick contacts.  No chest pain, cough, or shortness of breath.  No rhinorrhea, congestion, or sore throat.  No history of abdominal problems or surgeries.      Independent Historian:   Son-in-law, reports that patient has been stiffening lower extremity and 2 episodes of emesis.    Review of External Notes:     ROS:  Review of Systems   Constitutional: Positive for chills and fever.   HENT: Negative for congestion.    Respiratory: Negative for cough and shortness of breath.    Cardiovascular: Negative for chest pain.   Gastrointestinal: Positive for abdominal pain, nausea and vomiting. Negative for diarrhea.   Genitourinary: Negative for dysuria.   Neurological: Negative for headaches.   Psychiatric/Behavioral: Negative for confusion.   All other systems reviewed and are negative.      Allergies:  No Known Allergies     Medications:    No current outpatient medications on file.      Past Medical History:    No past medical history on file.    Past Surgical History:    No past surgical history on file.     Family History:    family history is not on file.    Social History:   Presents with son in law.  PCP: No primary care provider on file.     Physical Exam    Patient Vitals for the past 24 hrs:   BP Temp Temp src Pulse Resp SpO2 Weight   02/07/23 1827 123/79 100.2  F (37.9  C) Oral 91 18 98 % 66 kg (145 lb 8.1 oz)        Physical Exam  Constitutional: Mildly ill appearing.  HEENT: Atraumatic.  Moist mucous membranes.  Neck: Soft.  Supple.  No JVD.  Cardiac: Regular rate and rhythm.  No murmur or rub.  Respiratory: Clear to auscultation bilaterally.  No respiratory distress.  No wheezing, rhonchi, or rales.  Abdomen: Soft and nontender.  No guarding.  Nondistended.  Musculoskeletal: No edema.  Normal range of motion.  Neurologic: Alert and oriented.  Normal tone and bulk. 5/5 strength in bilateral upper and lower extremities.  Sensation to light touch intact throughout.    Skin: No rashes.  No edema.  Psych: Normal affect.  Normal behavior.        Emergency Department Course     Imaging:  CT Abdomen Pelvis w Contrast   Final Result   IMPRESSION:    1.  No acute or suspicious process.      Abdomen US, limited (RUQ only)   Final Result   IMPRESSION:   1.  Biliary sludge. No cholelithiasis or evidence of acute cholecystitis.      2.  Prominent right renal pelvis, possibly representing extrarenal pelvis, although mild hydronephrosis not excluded.            XR Chest 2 Views   Final Result   IMPRESSION: Trace interstitial edema. Tiny right pleural effusion. No focal airspace consolidation. No pneumothorax.      Upper limits of normal heart size.         Report per radiology    Laboratory:  Labs Ordered and Resulted from Time of ED Arrival to Time of ED Departure   COMPREHENSIVE METABOLIC PANEL - Abnormal       Result Value    Sodium 127 (*)     Potassium 3.2 (*)     Chloride 93 (*)     Carbon Dioxide (CO2) 24      Anion Gap 10      Urea Nitrogen 10.8      Creatinine 0.84      Calcium 9.0      Glucose 159 (*)     Alkaline Phosphatase 162 (*)      (*)      (*)     Protein Total 7.2      Albumin 3.9      Bilirubin Total 4.6 (*)     GFR Estimate 74     ROUTINE UA  WITH MICROSCOPIC REFLEX TO CULTURE - Abnormal    Color Urine Yellow      Appearance Urine Clear      Glucose Urine Negative      Bilirubin Urine Negative      Ketones Urine Trace (*)     Specific Gravity Urine 1.010      Blood Urine Small (*)     pH Urine 6.5      Protein Albumin Urine 20 (*)     Urobilinogen Urine Normal      Nitrite Urine Negative      Leukocyte Esterase Urine Negative      Bacteria Urine Few (*)     RBC Urine 3 (*)     WBC Urine 2     CBC WITH PLATELETS AND DIFFERENTIAL - Abnormal    WBC Count 14.9 (*)     RBC Count 3.90      Hemoglobin 11.8      Hematocrit 34.4 (*)     MCV 88      MCH 30.3      MCHC 34.3      RDW 13.2      Platelet Count 210      % Neutrophils 86      % Lymphocytes 6      % Monocytes 8      % Eosinophils 0      % Basophils 0      % Immature Granulocytes 0      NRBCs per 100 WBC 0      Absolute Neutrophils 12.7 (*)     Absolute Lymphocytes 0.9      Absolute Monocytes 1.2      Absolute Eosinophils 0.0      Absolute Basophils 0.0      Absolute Immature Granulocytes 0.1      Absolute NRBCs 0.0     TROPONIN T, HIGH SENSITIVITY - Abnormal    Troponin T, High Sensitivity 17 (*)    NT PROBNP INPATIENT - Abnormal    N terminal Pro BNP Inpatient 1,771 (*)    MAGNESIUM - Abnormal    Magnesium 1.6 (*)    LIPASE - Normal    Lipase 25     LACTIC ACID WHOLE BLOOD - Normal    Lactic Acid 1.0     INFLUENZA A/B & SARS-COV2 PCR MULTIPLEX - Normal    Influenza A PCR Negative      Influenza B PCR Negative      RSV PCR Negative      SARS CoV2 PCR Negative     BLOOD CULTURE   BLOOD CULTURE        Procedures       Emergency Department Course & Assessments:             Interventions:  Medications   0.9% sodium chloride BOLUS (1,000 mLs Intravenous New Bag 2/7/23 1900)   IV Zosyn  1000 mg p.o. Tylenol    Independent Interpretation (X-rays, CTs, rhythm strip):  Chest x-ray without pneumonia or pneumothorax per my read    Consultations/Discussion of Management or Tests:         Social Determinants of  Health affecting care:   Visiting from [place], logistical issues.    Assessments:      Disposition:  The patient was admitted to the hospital under the care of Dr. Vallejo.     Impression & Plan    Medical Decision Making:  Ana Rao is a 72-year-old woman who is febrile mildly ill-appearing.  A broad septic work-up was initiated.  She has no leukocytosis.  Her liver enzymes are quite elevated with a total bilirubin 4.6, AST and ALT in the 280s-290s.  Urine without obvious infection.  Chest x-ray without obvious pneumonia.  COVID, influenza, and RSV negative.  Right upper quadrant ultrasound was undertaken and revealed gallbladder sludge with no secondary signs of cholecystitis.  Discussed results with patient and family at the bedside and over the telephone.  I discussed her fever, leukocytosis, and elevated liver enzymes are concerning for potential cholecystitis versus cholangitis versus other.  I have given IV Zosyn and IV fluids.  I recommended admission to the hospital.  Understandably, there are logistical concerns that she is visiting from [place] and this is very understandable, however, we stressed that she could decompensate very quickly if left untreated.  They are understanding and ultimately she is agreeable to admission.  I spoke with hospital service who accepts her to the medical floor and she is in stable condition at time of admission    Diagnosis:    ICD-10-CM    1. Fever, unspecified fever cause  R50.9       2. Elevated liver enzymes  R74.8          2/7/2023   Rio Mendes MD Salay, Nicholas J, MD  02/08/23 0132

## 2023-02-08 NOTE — PHARMACY-ADMISSION MEDICATION HISTORY
Admission medication history interview status for this patient is complete. See [facility] admission navigator for allergy information, prior to admission medications and immunization status.     Medication history interview done, indicate source(s): Patient and Son  Medication history resources (including written lists, pill bottles, clinic record):None  Pharmacy: [facility]    Changes made to PTA medication list:  Added: All  Changed: None  Reported as Not Taking: None  Removed: None    Actions taken by pharmacist (provider contacted, etc):None     Additional medication history information:None    Medication reconciliation/reorder completed by provider prior to medication history?  N   (Y/N)       Medication Affordability:  Not including over the counter (OTC) medications, was there a time in the past 12 months when you did not take your medications as prescribed because of cost?: No       Prior to Admission medications    Medication Sig Last Dose Taking? Auth Provider Long Term End Date   acetaminophen (TYLENOL) 325 MG tablet Take 325-650 mg by mouth every 6 hours as needed for mild pain 2/7/2023 Yes Unknown, Entered By History     chlorthalidone (HYGROTON) 25 MG tablet Take 6.25 mg by mouth daily 2/6/2023 Yes Unknown, Entered By History Yes    olmesartan (BENICAR) 20 MG tablet Take 20 mg by mouth daily 2/6/2023 Yes Unknown, Entered By History Yes    pantoprazole (PROTONIX) 40 MG EC tablet Take 40 mg by mouth daily 2/7/2023 Yes Unknown, Entered By History

## 2023-02-08 NOTE — PROGRESS NOTES
[facility]    Medicine Progress Note - Hospitalist Service    Date of Admission:  2/7/2023    Assessment & Plan     Ana Rao is a 72 year old female without chronic medical problems. She is visiting from [place]. She presented to the ED on 2/7/2023 for evaluation of abdominal pain and chill. She arrived in the [place] from [facility] on 1/30/23. She developed fever to 101 and developed chills, abdominal pain, and vomiting. ED evaluation showed temperature 100.2 up to 103.3 overnight after admission. Laboratory evaluation showed WBC 14.9, Na 12, K 3.2,total bilirubin4.6, alk [id number], , [id number],BNP  1771, and troponin 17. UA was unremarkable. Testing for COVID, influenza, and RSV was negative. US of abdomen showed biliary sludge but no cholelithiasis or acute cholecystitis.  Prominent right renal pelvis was noed, possibly representing extrarenal pelvis, although mild hydronephrosis could not be excluded. She was started on Zosyn and admitted to the hospital for further cares. She had fever to 103.3 after admission. CT of abdomen and pelvis was obtained and showed no acute process. The morning after admission LFTs had improved: total biliubin 2.9, alk phos 122, , and . She was feeling better without pain.    Problem list:     Abdominal pain  Fever and chills  Elevated LFT's, improving  -Consider biliary obstruction- possibly due to sludge or rolled stone  -Continue Zosyn  -Will ask GI to see  -AM CBC and CMP    Hypokalemia, improved  Hypomagnesemia  -K and mag replacement protocols       Diet: Combination Diet Clear Liquid    DVT Prophylaxis: Enoxaparin (Lovenox) SQ  Carroll Catheter: Not present  Lines: None     Cardiac Monitoring: None  Code Status: Full Code      Clinically Significant Risk Factors Present on Admission        # Hypokalemia: Lowest K = 3.2 mmol/L in last 2 days, will replace as needed  # Hyponatremia: Lowest Na = 127 mmol/L in last 2 days, will  monitor as appropriate    # Hypomagnesemia: Lowest Mg = 1.6 mg/dL in last 2 days, will replace as needed   # Hypoalbuminemia: Lowest albumin = 2.9 g/dL at 2/8/2023  8:25 AM, will monitor as appropriate     # Hypertension: home medication list includes antihypertensive(s)              Disposition Plan      Expected Discharge Date: 02/09/2023                  Cordell Chapman MD  Hospitalist Service  [facility]  Securely message with [employer] (more info)  Text page via [employer] Paging/Directory   ______________________________________________________________________    Interval History   Feeling better today without abdominal pain, nausea, or vomiting.     Physical Exam   Vital Signs: Temp: 99.1  F (37.3  C) Temp src: Oral BP: 103/64 Pulse: 64   Resp: 18 SpO2: 98 % O2 Device: None (Room air)    Weight: 145 lbs 8.06 oz    GENERAL:  Comfortable. Cooperative.  PSYCH: pleasant, oriented, No acute distress.  EYES: PERRLA, Normal conjunctiva.  HEART:  Regular rate and rhythm. No JVD. Pulses normal. No edema.  LUNGS:  Clear to auscultation, normal Respiratory effort.  ABDOMEN:  Soft, no hepatosplenomegaly, normal bowel sounds.  EXTREMETIES: No clubbing, cyanosis or ischemia  SKIN:  Dry to touch, No rash.      Medical Decision Making       40 MINUTES SPENT BY ME on the date of service doing chart review, history, exam, documentation & further activities per the note.      Data     I have personally reviewed the following data over the past 24 hrs:    11.1 (H)  \   10.8 (L)   / 182     131 (L) 100 8.9 /  118 (H)   3.4 23 0.84 \       ALT: 191 (H) AST: 147 (H) AP: 122 (H) TBILI: 2.9 (H)   ALB: 2.9 (L) TOT PROTEIN: 6.2 (L) LIPASE: 25       Trop: 17 (H) BNP: 1,771 (H)       Procal: N/A CRP: N/A Lactic Acid: 1.0         Imaging results reviewed over the past 24 hrs:   Recent Results (from the past 24 hour(s))   XR Chest 2 Views    Narrative    EXAM: XR CHEST 2 VIEWS  LOCATION: [facility]  DATE/TIME: 2/7/2023 8:42 PM    INDICATION: Fever and chills.  COMPARISON: None available.      Impression    IMPRESSION: Trace interstitial edema. Tiny right pleural effusion. No focal airspace consolidation. No pneumothorax.    Upper limits of normal heart size.   Abdomen US, limited (RUQ only)    Narrative    EXAM: US ABDOMEN LIMITED  LOCATION: [facility]  DATE/TIME: 2/7/2023 8:46 PM    INDICATION: Fever and vomiting. Elevated liver enzymes.  COMPARISON: None.  TECHNIQUE: Limited abdominal ultrasound.    FINDINGS:    GALLBLADDER: Layering nonshadowing hyperechoic material in the gallbladder, likely biliary sludge. No shadowing gallstones identified. Gallbladder is partially decompressed. No appreciable gallbladder wall thickening. No pericholecystic fluid. Negative   sonographic Nicholson's sign.    BILE DUCTS: No biliary dilatation. The common duct measures 3 mm.    LIVER: Normal parenchyma with smooth contour. No focal mass.    RIGHT KIDNEY: Prominent right renal pelvis. No sonographically detectable renal calculi.    PANCREAS: The visualized portions are normal.    No ascites.      Impression    IMPRESSION:  1.  Biliary sludge. No cholelithiasis or evidence of acute cholecystitis.    2.  Prominent right renal pelvis, possibly representing extrarenal pelvis, although mild hydronephrosis not excluded.       CT Abdomen Pelvis w Contrast    Narrative    EXAM: CT ABDOMEN PELVIS W CONTRAST  LOCATION: [facility]  DATE/TIME: 2/8/2023 12:35 AM    INDICATION: RUQ pain concerns for cholangitis  COMPARISON: None.  TECHNIQUE: CT scan of the abdomen and pelvis was performed following injection of IV contrast. Multiplanar reformats were obtained. Dose reduction techniques were used.  CONTRAST: 73mL Isovue 370    FINDINGS:   LOWER CHEST: Normal.    HEPATOBILIARY: No significant mass or bile duct dilatation. No calcified gallstones.     PANCREAS: Normal.    SPLEEN:  Normal.    ADRENAL GLANDS: Normal.    KIDNEYS/BLADDER: No significant mass, stone, or hydronephrosis.    BOWEL: Normal.    LYMPH NODES: Normal.    VASCULATURE: Unremarkable.    PELVIC ORGANS: Normal.    MUSCULOSKELETAL: Partial sacralization L5.      Impression    IMPRESSION:   1.  No acute or suspicious process.     Recent Labs   Lab 02/08/23  0825 02/07/23  1859   WBC 11.1* 14.9*   HGB 10.8* 11.8   MCV 89 88    210   * 127*   POTASSIUM 3.4 3.2*   CHLORIDE 100 93*   CO2 23 24   BUN 8.9 10.8   CR 0.84 0.84   ANIONGAP 8 10   DESI 8.4* 9.0   * 159*   ALBUMIN 2.9* 3.9   PROTTOTAL 6.2* 7.2   BILITOTAL 2.9* 4.6*   ALKPHOS 122* 162*   * 299*   * 286*   LIPASE  --  25

## 2023-02-09 VITALS
HEIGHT: 62 IN | HEART RATE: 67 BPM | RESPIRATION RATE: 16 BRPM | SYSTOLIC BLOOD PRESSURE: 137 MMHG | BODY MASS INDEX: 28.39 KG/M2 | TEMPERATURE: 97.7 F | OXYGEN SATURATION: 100 % | WEIGHT: 154.3 LBS | DIASTOLIC BLOOD PRESSURE: 66 MMHG

## 2023-02-09 LAB
ALBUMIN SERPL BCG-MCNC: 3.1 G/DL (ref 3.5–5.2)
ALP SERPL-CCNC: 119 U/L (ref 35–104)
ALT SERPL W P-5'-P-CCNC: 152 U/L (ref 10–35)
ANION GAP SERPL CALCULATED.3IONS-SCNC: 6 MMOL/L (ref 7–15)
AST SERPL W P-5'-P-CCNC: 109 U/L (ref 10–35)
BILIRUB SERPL-MCNC: 1.6 MG/DL
BUN SERPL-MCNC: 6.2 MG/DL (ref 8–23)
CALCIUM SERPL-MCNC: 8.9 MG/DL (ref 8.8–10.2)
CHLORIDE SERPL-SCNC: 106 MMOL/L (ref 98–107)
CREAT SERPL-MCNC: 0.83 MG/DL (ref 0.51–0.95)
DEPRECATED HCO3 PLAS-SCNC: 24 MMOL/L (ref 22–29)
ERYTHROCYTE [DISTWIDTH] IN BLOOD BY AUTOMATED COUNT: 14 % (ref 10–15)
GFR SERPL CREATININE-BSD FRML MDRD: 75 ML/MIN/1.73M2
GLUCOSE SERPL-MCNC: 105 MG/DL (ref 70–99)
HAV IGM SERPL QL IA: NONREACTIVE
HBV SURFACE AB SERPL IA-ACNC: 1.5 M[IU]/ML
HBV SURFACE AB SERPL IA-ACNC: NONREACTIVE M[IU]/ML
HBV SURFACE AG SERPL QL IA: NONREACTIVE
HCT VFR BLD AUTO: 32.5 % (ref 35–47)
HCV AB SERPL QL IA: NONREACTIVE
HGB BLD-MCNC: 10.6 G/DL (ref 11.7–15.7)
MCH RBC QN AUTO: 29.6 PG (ref 26.5–33)
MCHC RBC AUTO-ENTMCNC: 32.6 G/DL (ref 31.5–36.5)
MCV RBC AUTO: 91 FL (ref 78–100)
PLATELET # BLD AUTO: 179 10E3/UL (ref 150–450)
POTASSIUM SERPL-SCNC: 4.5 MMOL/L (ref 3.4–5.3)
PROT SERPL-MCNC: 6.7 G/DL (ref 6.4–8.3)
RBC # BLD AUTO: 3.58 10E6/UL (ref 3.8–5.2)
SODIUM SERPL-SCNC: 136 MMOL/L (ref 136–145)
WBC # BLD AUTO: 7.6 10E3/UL (ref 4–11)

## 2023-02-09 PROCEDURE — 99238 HOSP IP/OBS DSCHRG MGMT 30/<: CPT

## 2023-02-09 PROCEDURE — 250N000013 HC RX MED GY IP 250 OP 250 PS 637: Performed by: INTERNAL MEDICINE

## 2023-02-09 PROCEDURE — 36415 COLL VENOUS BLD VENIPUNCTURE: CPT | Performed by: INTERNAL MEDICINE

## 2023-02-09 PROCEDURE — 250N000011 HC RX IP 250 OP 636: Performed by: INTERNAL MEDICINE

## 2023-02-09 PROCEDURE — 80053 COMPREHEN METABOLIC PANEL: CPT | Performed by: INTERNAL MEDICINE

## 2023-02-09 PROCEDURE — 96366 THER/PROPH/DIAG IV INF ADDON: CPT

## 2023-02-09 PROCEDURE — G0378 HOSPITAL OBSERVATION PER HR: HCPCS

## 2023-02-09 PROCEDURE — 250N000011 HC RX IP 250 OP 636: Performed by: EMERGENCY MEDICINE

## 2023-02-09 PROCEDURE — 96372 THER/PROPH/DIAG INJ SC/IM: CPT | Performed by: INTERNAL MEDICINE

## 2023-02-09 PROCEDURE — 85027 COMPLETE CBC AUTOMATED: CPT | Performed by: INTERNAL MEDICINE

## 2023-02-09 RX ORDER — CIPROFLOXACIN 500 MG/1
500 TABLET, FILM COATED ORAL 2 TIMES DAILY
Qty: 14 TABLET | Refills: 0 | Status: SHIPPED | OUTPATIENT
Start: 2023-02-09 | End: 2023-02-16

## 2023-02-09 RX ADMIN — TAZOBACTAM SODIUM AND PIPERACILLIN SODIUM 3.38 G: 375; 3 INJECTION, SOLUTION INTRAVENOUS at 03:08

## 2023-02-09 RX ADMIN — FAMOTIDINE 20 MG: 20 TABLET, FILM COATED ORAL at 08:36

## 2023-02-09 RX ADMIN — ENOXAPARIN SODIUM 30 MG: 30 INJECTION SUBCUTANEOUS at 08:36

## 2023-02-09 RX ADMIN — TAZOBACTAM SODIUM AND PIPERACILLIN SODIUM 3.38 G: 375; 3 INJECTION, SOLUTION INTRAVENOUS at 08:55

## 2023-02-09 ASSESSMENT — ACTIVITIES OF DAILY LIVING (ADL)
ADLS_ACUITY_SCORE: 35
ADLS_ACUITY_SCORE: 35
ADLS_ACUITY_SCORE: 33
ADLS_ACUITY_SCORE: 35

## 2023-02-09 NOTE — PLAN OF CARE
PRIMARY DIAGNOSIS:  Fever, Elevated LFTs  OUTPATIENT/OBSERVATION GOALS TO BE MET BEFORE DISCHARGE:  1. Stable vital signs Yes  2. Tolerating diet:Yes  3. Pain controlled with oral pain medications:  Denies any pain.  4. Positive bowel sounds:  Yes  5. Voiding without difficulty:  Yes  6. Able to ambulate:  Yes, SBA   7. Provider specific discharge goals met:  Yes    Vitals are Temp: 98.3  F (36.8  C) Temp src: Oral BP: 129/64 Pulse: 60   Resp: 18 SpO2: 100 %.    Patient is Alert and Oriented x4. Afebrile  Denies any pain with interview. Patient has Normal Saline 0.9% infusing at 100 mL per hour. Pt is a Regular diet. She is SBA with Gait Belt and Walker. Plan is possible discharge today.     Discharge Planner Nurse   Safe discharge environment identified: Yes  Barriers to discharge: Yes       Entered by: Linda Garcia RN 02/09/2023 5:36 AM    Please review provider order for any additional goals.   Nurse to notify provider when observation goals have been met and patient is ready for discharge.

## 2023-02-09 NOTE — PLAN OF CARE
Goal Outcome Evaluation:  PRIMARY DIAGNOSIS: Fever, Elevated LFTs  OUTPATIENT/OBSERVATION GOALS TO BE MET BEFORE DISCHARGE:  1. ADLs back to baseline: Yes    2. Activity and level of assistance: Ambulating independently.    3. Pain status: Pain free.    4. Return to near baseline physical activity: Yes     Discharge Planner Nurse   Safe discharge environment identified: Yes  Barriers to discharge: No       Entered by: Aria Hills RN 02/09/2023 9:54 AM     Please review provider order for any additional goals.   Nurse to notify provider when observation goals have been met and patient is ready for discharge.

## 2023-02-09 NOTE — PLAN OF CARE
Goal Outcome Evaluation:         Patient's After Visit Summary was reviewed with patient and/or family.   Patient verbalized understanding of After Visit Summary, recommended follow up and was given an opportunity to ask questions.   Discharge medications sent home with patient/family: YES, No   Discharged with son            OBSERVATION patient END [id number]:

## 2023-02-09 NOTE — PLAN OF CARE
PRIMARY DIAGNOSIS:  Fever, Elevated LFTs  OUTPATIENT/OBSERVATION GOALS TO BE MET BEFORE DISCHARGE:  1. Stable vital signs Yes  2. Tolerating diet:Yes  3. Pain controlled with oral pain medications:  Denies any pain.  4. Positive bowel sounds:  Yes  5. Voiding without difficulty:  Yes  6. Able to ambulate:  Yes, SBA   7. Provider specific discharge goals met:  Yes    Vitals are Temp: 98.9  F (37.2  C) Temp src: Oral BP: 123/59 Pulse: 71   Resp: 17 SpO2: 98 %.    Patient is Alert and Oriented x4. Denies any pain with interview. Patient has Normal Saline 0.9% running at 100 mL per hour. Pt is a Regular diet. She is SBA with Gait Belt and Walker. Will continue to monitor.    Discharge Planner Nurse   Safe discharge environment identified: Yes  Barriers to discharge: Yes       Entered by: Linda Garcia RN 02/08/2023 9:58 PM    Please review provider order for any additional goals.   Nurse to notify provider when observation goals have been met and patient is ready for discharge.

## 2023-02-09 NOTE — PLAN OF CARE
PRIMARY DIAGNOSIS:  Fever, Elevated LFTs  OUTPATIENT/OBSERVATION GOALS TO BE MET BEFORE DISCHARGE:  1. Stable vital signs Yes  2. Tolerating diet:Yes  3. Pain controlled with oral pain medications:  Denies any pain.  4. Positive bowel sounds:  Yes  5. Voiding without difficulty:  Yes  6. Able to ambulate:  Yes, SBA   7. Provider specific discharge goals met:  Yes    Vitals are Temp: 98.3  F (36.8  C) Temp src: Oral BP: 129/64 Pulse: 60   Resp: 18 SpO2: 100 %.    Patient is Alert and Oriented x4. Afebrile  Denies any pain with interview. Patient has Normal Saline 0.9% infusing at 100 mL per hour. Pt is a Regular diet. She is SBA with Gait Belt and Walker. Will continue to monitor.    Discharge Planner Nurse   Safe discharge environment identified: Yes  Barriers to discharge: Yes       Entered by: Linda Garcia RN 02/09/2023 2:36 AM    Please review provider order for any additional goals.   Nurse to notify provider when observation goals have been met and patient is ready for discharge.

## 2023-02-09 NOTE — DISCHARGE SUMMARY
[facility]  Hospitalist Discharge Summary      Date of Admission:  2/7/2023  Date of Discharge:  2/9/2023 11:55 AM  Discharging Provider: WILD Vines PA-C  Discharge Service: Hospitalist Service    Discharge Diagnoses   Abdominal pain unknown etiology  Elevated LFT's    Follow-ups Needed After Discharge   Follow up with primary care provider, Physician No Ref-Primary, within 7 days for hospital follow- up.  The following labs/tests are recommended: CMP, CBC, follow up on hepatitis results. Consider general surgery referral to assess future gallbladder removal     Unresulted Labs Ordered in the Past 30 Days of this Admission     Date and Time Order Name Status Description    2/8/2023  9:23 AM Hepatitis E Antibody IgM In process     2/8/2023  9:23 AM Hepatits C antibody In process     2/8/2023  9:23 AM Hepatitis B Surface Antibody In process     2/8/2023  9:23 AM Hepatitis B surface antigen In process     2/8/2023  9:23 AM Hepatitis A antibody IgM In process     2/7/2023  9:27 PM Blood Culture Arm, Left Preliminary NGTD    2/7/2023  9:27 PM Blood Culture Hand, Right Preliminary NGTD      These results will be followed up by hospitalist    Discharge Disposition   Discharged to home  Condition at discharge: Stable    Hospital Course   Ana Rao is a 72 year old female without chronic medical problems. She is visiting from [facility]. She presented to the ED on 2/7/2023 for evaluation of abdominal pain and chill. She [place] the [place] from [facility] on 1/30/23. She developed fever to 101 and developed chills, abdominal pain, and vomiting. ED evaluation showed temperature 100.2 up to 103.3 overnight after admission. Laboratory evaluation showed WBC 14.9, Na 12, K 3.2,total bilirubin4.6, alk fjem252, , MNW820,BNP  1771, and troponin 17. UA was unremarkable. Testing for COVID, influenza, and RSV was negative. US of abdomen showed biliary sludge but no cholelithiasis or acute  cholecystitis.  Prominent right renal pelvis was noted, possibly representing extrarenal pelvis, although mild hydronephrosis could not be excluded. She was started on Zosyn and admitted to the hospital for further cares. She had fever to 103.3 after admission. CT of abdomen and pelvis was obtained and showed no acute process. The morning after admission LFTs had improved: total biliubin 2.9, alk phos 122, , and . She was feeling better without pain.    Patient denied any abdominal pain after being admitted. Fever and chills resolved. Unclear of the exact cause for the abdominal pain and fever as the only abnormal finding on imaging was the biliary sludge. Patient remained asymptomatic during the entire admission. Plan was to follow up with general surgery outpatient for possible cholecystectomy in the future. I did not have any concerns for cholangitis at time of discharge but was discharged on antibiotics as precaution. At time of discharge, patient denied any abdominal pain, N/V. She had no concerns and was ready to discharge. Denied fever or chills. Ambulating well, tolerating diet.      Abdominal pain  Fever and chills  Elevated LFT's, improving  - Possible biliary obstruction due to sludge but no gallstones seen on abdominal ultrasound or CT imaging. LFTs were improving.  - Received IV zosyn and transitioned to ciprofloxacin x 7 days to cover   - GI was consulted. They ordered hepatitis panel which was pending at discharge.   - consider outpatient general surgery consult for cholecystectomy  - repeat CBC and CMP in 1 week. Patient is flying to [place] in 2 days to stay with her daughter.  Her daughter is a MD and would be able to assist with arranging follow-up labs.  - blood cultures NGTD  - Educated on return precautions of fever, chills, new abdominal pain     Hypokalemia, improved  Hypomagnesemia  -K and mag replacement protocols    Consultations This Hospital Stay   GASTROENTEROLOGY IP  CONSULT    Code Status   Prior    Time Spent on this Encounter   I, WILD Vines PA-C, personally saw the patient today and spent less than or equal to 30 minutes discharging this patient.       WILD Vines PA-C  [facility] OBSERVATION DEPT  [address] [place] [address]  Phone: [phone]  ______________________________________________________________________    Physical Exam   Vital Signs:                    Weight: 154 lbs 4.8 oz    GENERAL:  Alert, Comfortable, No acute distress. Sitting up in bed.  PSYCH: pleasant, oriented.  HEENT:  Normocephalic,  No scleral icterus or conjunctival injection, normal hearing  NECK:  Supple  HEART:  Normal S1, S2 with no murmur, RRR  LUNGS:  Normal Respiratory effort. Clear to auscultation bilaterally with no wheezing, rales or ronchi.  ABDOMEN:  Soft, non-tender, non distended. No peritoneal signs.   EXTREMITIES:  Trace pedal edema, No cyanosis.   SKIN:  Warm, dry to touch. No rash  NEUROLOGIC: Speech clear, alert & orientated x 4, no focal deficits.        Primary Care Physician   Physician No Ref-Primary    Discharge Orders      Comprehensive metabolic panel     CBC with platelets     Follow-up and recommended labs and tests     Follow up with primary care provider, Physician No Ref-Primary, within 7 days for hospital follow- up.  The following labs/tests are recommended: CMP, CBC, follow up on hepatitis results.     Activity    Your activity upon discharge: activity as tolerated     Reason for your hospital stay    Mended to the hospital due to fever/chills abdominal pain.  You were admitted to the observation unit for further monitoring.  Blood work showed elevated liver function test and an elevated white count.  An ultrasound of your abdomen showed biliary sludge but no gallstones or signs of cholecystitis.  CT of your abdomen showed no acute process which is reassuring. You were seen by a gastrointestinal provider who recommended  testing for hepatitis.  These hepatitis labs are still pending and someone should be calling to follow-up with you with the results.  You will need to have a repeat blood work in 1 week to follow your liver function. Will be discharged on an antibiotic ciprofloxacin 500 mg BID for 7 days. In the meantime if you develop any fever, chills or new please contact a primary care provider or urgent care or ED provider. Consider following up with a general surgeon outpatient to discuss gallbladder removal.     Diet    Follow this diet upon discharge: Regular       Significant Results and Procedures   Results for orders placed or performed during the hospital encounter of 02/07/23   XR Chest 2 Views    Narrative    EXAM: XR CHEST 2 VIEWS  LOCATION: [facility]  DATE/TIME: 2/7/2023 8:42 PM    INDICATION: Fever and chills.  COMPARISON: None available.      Impression    IMPRESSION: Trace interstitial edema. Tiny right pleural effusion. No focal airspace consolidation. No pneumothorax.    Upper limits of normal heart size.   Abdomen US, limited (RUQ only)    Narrative    EXAM: US ABDOMEN LIMITED  LOCATION: [facility]  DATE/TIME: 2/7/2023 8:46 PM    INDICATION: Fever and vomiting. Elevated liver enzymes.  COMPARISON: None.  TECHNIQUE: Limited abdominal ultrasound.    FINDINGS:    GALLBLADDER: Layering nonshadowing hyperechoic material in the gallbladder, likely biliary sludge. No shadowing gallstones identified. Gallbladder is partially decompressed. No appreciable gallbladder wall thickening. No pericholecystic fluid. Negative   sonographic Nicholson's sign.    BILE DUCTS: No biliary dilatation. The common duct measures 3 mm.    LIVER: Normal parenchyma with smooth contour. No focal mass.    RIGHT KIDNEY: Prominent right renal pelvis. No sonographically detectable renal calculi.    PANCREAS: The visualized portions are normal.    No ascites.      Impression    IMPRESSION:  1.  Biliary  sludge. No cholelithiasis or evidence of acute cholecystitis.    2.  Prominent right renal pelvis, possibly representing extrarenal pelvis, although mild hydronephrosis not excluded.       CT Abdomen Pelvis w Contrast    Narrative    EXAM: CT ABDOMEN PELVIS W CONTRAST  LOCATION: [facility]  DATE/TIME: 2/8/2023 12:35 AM    INDICATION: RUQ pain concerns for cholangitis  COMPARISON: None.  TECHNIQUE: CT scan of the abdomen and pelvis was performed following injection of IV contrast. Multiplanar reformats were obtained. Dose reduction techniques were used.  CONTRAST: 73mL Isovue 370    FINDINGS:   LOWER CHEST: Normal.    HEPATOBILIARY: No significant mass or bile duct dilatation. No calcified gallstones.     PANCREAS: Normal.    SPLEEN: Normal.    ADRENAL GLANDS: Normal.    KIDNEYS/BLADDER: No significant mass, stone, or hydronephrosis.    BOWEL: Normal.    LYMPH NODES: Normal.    VASCULATURE: Unremarkable.    PELVIC ORGANS: Normal.    MUSCULOSKELETAL: Partial sacralization L5.      Impression    IMPRESSION:   1.  No acute or suspicious process.       Discharge Medications   Discharge Medication List as of 2/9/2023 10:20 AM      CONTINUE these medications which have NOT CHANGED    Details   acetaminophen (TYLENOL) 325 MG tablet Take 325-650 mg by mouth every 6 hours as needed for mild pain, Historical      chlorthalidone (HYGROTON) 25 MG tablet Take 6.25 mg by mouth daily, Historical      olmesartan (BENICAR) 20 MG tablet Take 20 mg by mouth daily, Historical      pantoprazole (PROTONIX) 40 MG EC tablet Take 40 mg by mouth daily, Historical           Allergies   No Known Allergies

## 2023-02-09 NOTE — PROGRESS NOTES
"Provider requesting assistance in arranging follow-up labs for pt. They would like for pt to have labs done at [facility] and hospitalist will receive/review results. Labs ordered, AVS updated.     \"You can come to [facility] lab for your repeat lab work. No appointment needed, come to the  at the hospital to check-in and they will direct you to the lab.     [place] [address]    Lab hours:   Monday-Friday 7am-7pm  Saturday and Sunday 9am-12pm   Phone: [phone]\"    Alexandra Leary RN BSN   Inpatient Care Coordination  [facility]   Phone [phone]    "

## 2023-02-09 NOTE — DISCHARGE INSTRUCTIONS
You can come to [facility] lab for your repeat lab work. No appointment needed, come to the  at the hospital to check-in and they will direct you to the lab.     [address] [place] [address]    Lab hours:   Monday-Friday 7am-7pm  Saturday and Sunday 9am-12pm   Phone: [phone]

## 2023-02-09 NOTE — PROGRESS NOTES
"[facility]  [facility]  Gastroenterology Progress note    Interval History:    Feeling back to normal. No pain. No fevers. Plan is to discharge today.     Vital Signs:      BP (!) 131/95 (BP Location: Right arm)   Pulse 58   Temp 97.9  F (36.6  C) (Oral)   Resp 16   Ht 1.575 m (5' 2\")   Wt 70 kg (154 lb 4.8 oz)   SpO2 100%   BMI 28.22 kg/m    Temp (24hrs), Av.7  F (37.1  C), Min:97.9  F (36.6  C), Max:99.7  F (37.6  C)    Patient Vitals for the past 72 hrs:   Weight   23 195 70 kg (154 lb 4.8 oz)   23 1000 66.7 kg (147 lb)   23 1827 66 kg (145 lb 8.1 oz)       Intake/Output Summary (Last 24 hours) at 2023 1102  Last data filed at 2023 1300  Gross per 24 hour   Intake 250 ml   Output --   Net 250 ml         Constitutional: NAD, comfortable   Respiratory: Non-labored  Abdomen: nondistended,     Additional Comments:  ROS, FH, SH: See initial GI consult for details.    Laboratory Data:  Recent Labs   Lab Test 23  1859   WBC 7.6 11.1* 14.9*   HGB 10.6* 10.8* 11.8   MCV 91 89 88    182 210     Recent Labs   Lab Test 23  1453 02/08/23  0825 02/07/23  1859     --  131* 127*   POTASSIUM 4.5 4.4 3.4 3.2*   CHLORIDE 106  --  100 93*   CO2 24  --   24   BUN 6.2*  --  8.9 10.8   CR 0.83  --  0.84 0.84   ANIONGAP 6*  --  8 10   DESI 8.9  --  8.4* 9.0     Recent Labs   Lab Test 23  1859   ALBUMIN 3.1* 2.9*  --  3.9   BILITOTAL 1.6* 2.9*  --  4.6*   * 191*  --  299*   * 147*  --  286*   ALKPHOS 119* 122*  --  162*   PROTEIN  --   --  20*  --    LIPASE  --   --   --  25       CONSULTATION ASSESSMENT AND PLAN:    Patient is from [facility], visiting family, arrived 23 to [facility].  Presents with Elevated LFTs and systemic symptoms of fever, chills, generalized fatigue, and vomiting.  The differential diagnosis for this " includes an infectious etiology and less likely congestive hepatopathy (noted elevated BNP, and hospitalists notes that clinically she does not appear to be in heart failure).  I doubt that she has hepatitis A, B, C, E (hep A and E common in angeline) however we will check serologies for this.  She could have passed sludge given the pattern of the liver function test with improvement today (GB sludge noted on US).  She could have had a transient bacteremia/sepsis (unclear source) that caused LFTs to elevate.  She has improved with broad-spectrum antibiotics with Zosyn.  At 2:48 AM she had a temperature of 103.3, and currently is afebrile.  She reports all of her symptoms have now resolved and is wondering if she can discharge home.     2/9: LFTs trending down. Feeling well. Plan is to discharge today.     -- hep a/b/c/e pending- [facility] will follow these  -- continue supportive measures  -- can have general diet  -- discharge on cipro 500mg BID x 7 days.   -- consider general surgery follow up to consider cholecystectomy.   -- discharging today. GI will sign off, please call for questions/concerns.     Discussed with Dr. Maza, GI staff.    35 minutes spent on patient care including chart review, patient visit, documentation, coordination.     Marily De Oliveira PA-C  [facility]  Cell: [phone] until 12PM  Office: [phone]

## 2023-02-10 LAB — HEV IGM SER QL: NEGATIVE

## 2023-02-12 ENCOUNTER — HEALTH MAINTENANCE LETTER (OUTPATIENT)
Age: 73
End: 2023-02-12

## 2023-02-13 LAB
BACTERIA BLD CULT: NO GROWTH
BACTERIA BLD CULT: NO GROWTH

## 2024-03-10 ENCOUNTER — HEALTH MAINTENANCE LETTER (OUTPATIENT)
Age: 74
End: 2024-03-10

## 2025-02-16 ENCOUNTER — HEALTH MAINTENANCE LETTER (OUTPATIENT)
Age: 75
End: 2025-02-16

## 2025-03-16 ENCOUNTER — HEALTH MAINTENANCE LETTER (OUTPATIENT)
Age: 75
End: 2025-03-16